# Patient Record
Sex: MALE | Race: WHITE
[De-identification: names, ages, dates, MRNs, and addresses within clinical notes are randomized per-mention and may not be internally consistent; named-entity substitution may affect disease eponyms.]

---

## 2020-03-13 ENCOUNTER — HOSPITAL ENCOUNTER (OUTPATIENT)
Dept: HOSPITAL 92 - BICCT | Age: 35
Discharge: HOME | End: 2020-03-13
Attending: INTERNAL MEDICINE
Payer: COMMERCIAL

## 2020-03-13 DIAGNOSIS — I25.10: ICD-10-CM

## 2020-03-13 DIAGNOSIS — R94.39: Primary | ICD-10-CM

## 2020-03-13 PROCEDURE — 75574 CT ANGIO HRT W/3D IMAGE: CPT

## 2020-03-18 NOTE — CT
CT CORONARY CALCIUM SCORE WITHOUT IV CONTRAST

CT CORONARY ARTERIOGRAM WITH IV CONTRAST AND 3D POSTPROCESSING:

 

HISTORY:  

34-year-old male with abnormal stress test, strong family history of coronary artery disease. 

 

FINDINGS:

 

The coronary artery calcium scoring was performed using the AJ-130 method:

Left Main:  0

RCA:  0

LAD:  0

LCX:  32

Total:  32

 

There was a small focal area of calcified plaque in the wall of the proximal left circumflex artery w
ithout significant stenosis. Luminal narrowing is less than 30%. No other calcified plaques are seen 
in the remainder of the coronary arterial vascular system. The left main, LAD, and RCA demonstrate no
rmal flow and no stenosis or calcified plaque. 

 

Left ventricular function measurements are as follows:

LVEF:  57%

EDV:  159 mL

ESV:  69 mL

Stroke Volume:  90 mL/minute

Cardiac Output:  4.1 L/minute

Myocardial Mass:  147 gm

 

The left ventricular wall motion is normal. The visualized lung fields are clear. No pleural or peric
ardial effusions are identified. There are mild degenerative changes in the spine. 

 

 

 

IMPRESSION: 

 

1.  Total CAC is 32. 

 

2.  Minimal stenosis due to calcified plaque in the proximal LCX. 

 

 

 

 

 

POS: [id number]